# Patient Record
Sex: FEMALE | Race: WHITE | NOT HISPANIC OR LATINO | Employment: PART TIME | URBAN - METROPOLITAN AREA
[De-identification: names, ages, dates, MRNs, and addresses within clinical notes are randomized per-mention and may not be internally consistent; named-entity substitution may affect disease eponyms.]

---

## 2017-05-16 ENCOUNTER — HOSPITAL ENCOUNTER (OUTPATIENT)
Facility: MEDICAL CENTER | Age: 27
End: 2017-05-16
Attending: PHYSICIAN ASSISTANT
Payer: COMMERCIAL

## 2017-05-16 ENCOUNTER — OFFICE VISIT (OUTPATIENT)
Dept: URGENT CARE | Facility: PHYSICIAN GROUP | Age: 27
End: 2017-05-16
Payer: COMMERCIAL

## 2017-05-16 VITALS
WEIGHT: 145 LBS | RESPIRATION RATE: 16 BRPM | SYSTOLIC BLOOD PRESSURE: 100 MMHG | HEART RATE: 64 BPM | BODY MASS INDEX: 22.76 KG/M2 | HEIGHT: 67 IN | DIASTOLIC BLOOD PRESSURE: 60 MMHG | OXYGEN SATURATION: 99 % | TEMPERATURE: 97.3 F

## 2017-05-16 DIAGNOSIS — N30.00 ACUTE CYSTITIS WITHOUT HEMATURIA: ICD-10-CM

## 2017-05-16 LAB
APPEARANCE UR: CLEAR
BILIRUB UR STRIP-MCNC: ABNORMAL MG/DL
COLOR UR AUTO: ABNORMAL
GLUCOSE UR STRIP.AUTO-MCNC: ABNORMAL MG/DL
INT CON NEG: NORMAL
INT CON POS: NORMAL
KETONES UR STRIP.AUTO-MCNC: ABNORMAL MG/DL
LEUKOCYTE ESTERASE UR QL STRIP.AUTO: ABNORMAL
NITRITE UR QL STRIP.AUTO: ABNORMAL
PH UR STRIP.AUTO: ABNORMAL [PH] (ref 5–8)
POC URINE PREGNANCY TEST: NORMAL
PROT UR QL STRIP: ABNORMAL MG/DL
RBC UR QL AUTO: ABNORMAL
SP GR UR STRIP.AUTO: ABNORMAL
UROBILINOGEN UR STRIP-MCNC: ABNORMAL MG/DL

## 2017-05-16 PROCEDURE — 87077 CULTURE AEROBIC IDENTIFY: CPT

## 2017-05-16 PROCEDURE — 87086 URINE CULTURE/COLONY COUNT: CPT

## 2017-05-16 PROCEDURE — 81025 URINE PREGNANCY TEST: CPT | Performed by: PHYSICIAN ASSISTANT

## 2017-05-16 PROCEDURE — 81002 URINALYSIS NONAUTO W/O SCOPE: CPT | Performed by: PHYSICIAN ASSISTANT

## 2017-05-16 PROCEDURE — 99204 OFFICE O/P NEW MOD 45 MIN: CPT | Performed by: PHYSICIAN ASSISTANT

## 2017-05-16 PROCEDURE — 87186 SC STD MICRODIL/AGAR DIL: CPT

## 2017-05-16 PROCEDURE — 99000 SPECIMEN HANDLING OFFICE-LAB: CPT | Performed by: PHYSICIAN ASSISTANT

## 2017-05-16 RX ORDER — NITROFURANTOIN 25; 75 MG/1; MG/1
100 CAPSULE ORAL 2 TIMES DAILY
Qty: 10 CAP | Refills: 0 | Status: SHIPPED | OUTPATIENT
Start: 2017-05-16 | End: 2017-05-21

## 2017-05-16 RX ORDER — NORGESTIMATE AND ETHINYL ESTRADIOL 7DAYSX3 LO
KIT ORAL
COMMUNITY

## 2017-05-16 NOTE — PROGRESS NOTES
"Chief Complaint   Patient presents with   • Dysuria     1 day       HISTORY OF PRESENT ILLNESS: Patient is a 26 y.o. female who presents today for about 24 hours of UTI symptoms.   She is feeling some pressure in bladder area, hurts to pee towards the end and increased urgency and frequency.  She has not noticed any blood in her urine.  No nausea, vomiting, fevers, or chills or flank/back pain.  No vaginal discharge or abnormal.   She has been taking Azo with mild relief.       There are no active problems to display for this patient.      Allergies:Asa; Pcn; and Sulfa drugs    Current Outpatient Prescriptions Ordered in [facility]   Medication Sig Dispense Refill   • Phenazopyridine-Butabarb-Hyosc (PYRIDIUM PLUS PO) Take  by mouth.     • Norgestim-Eth Estrad Triphasic (ORTHO TRI-CYCLEN LO) 0.18/0.215/0.25 MG-25 MCG Tab Take  by mouth.       No current Epic-ordered facility-administered medications on file.       No past medical history on file.    Social History   Substance Use Topics   • Smoking status: Never Smoker    • Smokeless tobacco: None   • Alcohol Use: None       No family status information on file.   No family history on file. No pertinent FH.     ROS:  Review of Systems   Constitutional: Negative for fever, chills, weight loss and malaise/fatigue.   HENT: Negative for ear pain, nosebleeds, congestion, sore throat and neck pain.    Eyes: Negative for blurred vision.   Respiratory: Negative for cough, sputum production, shortness of breath and wheezing.    Cardiovascular: Negative for chest pain, palpitations, orthopnea and leg swelling.   Gastrointestinal: Negative for heartburn, nausea, vomiting and abdominal pain.   Genitourinary: SEE HPI  All other systems reviewed and are negative.       Exam:  Blood pressure 100/60, pulse 64, temperature 36.3 °C (97.3 °F), resp. rate 16, height 1.702 m (5' 7.01\"), weight 65.772 kg (145 lb), last menstrual period 05/10/2017, SpO2 99 %, not currently " breastfeeding.  General:  Well nourished, well developed female in NAD  Eyes: PERRLA, EOM within normal limits, no conjunctival injection, no scleral icterus, visual fields and acuity grossly intact.  Ears: Normal shape and symmetry, no tenderness, no discharge. External canals are without any significant edema or erythema. Tympanic membranes are without any inflammation, no effusion. Gross auditory acuity is intact  Nose: Symmetrical, sinuses without tenderness, no discharge.   Mouth: reasonable hygiene, no erythema exudates or tonsillar enlargement.  Neck: no masses, range of motion within normal limits, no tracheal deviation. No lymphadenopathy  Pulmonary: Normal respiratory effort, no wheezes, crackles, or rhonchi.  Cardiovascular: regular rate and rhythm without murmurs, rubs, or gallops.  Abdomen: Soft, mild TTP suprapubically, nondistended. Normal bowel sounds. No hepatosplenomegaly or masses, or hernias. No rebound or guarding.  No CVA tenderness   Skin: No visible rashes or lesion. Warm, pink, dry.   Extremities: no clubbing, cyanosis, or edema.  Neuro: A&O x 3. Speech normal/clear.  Normal gait.       Component Results      Component Value Ref Range & Units Status     POC Color Red Negative Final     POC Appearance Clear Negative Final     POC Leukocyte Esterase Small Negative Final     POC Nitrites Inc Negative Final     POC Urobiligen Inc Negative (0.2) mg/dL Final     POC Protein Inc Negative mg/dL Final     POC Urine PH Inc 5.0 - 8.0 Final     POC Blood Small Negative Final     POC Specific [place] Inc <1.005 - >1.030 Final     POC Ketones Inc Negative mg/dL Final     POC Biliruben Inc Negative mg/dL Final     POC Glucose Neg Negative mg/dL Final         Assessment/Plan:  1. Acute cystitis without hematuria  POCT Urinalysis    POCT Pregnancy    URINE CULTURE(NEW)    nitrofurantoin monohydr macro (MACROBID) 100 MG Cap       -POCT U/A as above.  Culture sent  -Fluids emphasized.  Void after intercourse.   Recommend abstain until treatment complete/symptoms resolved.   -signs and symptoms of worsening/ascending infection discussed and to seek prompt medical care should they arise.       Supportive care, differential diagnoses, and indications for immediate follow-up discussed with patient.   Pathogenesis of diagnosis discussed including typical length and natural progression.   Instructed to return to clinic or nearest emergency department for any change in condition, further concerns, or worsening of symptoms.  Patient states understanding of the plan of care and discharge instructions.      Ariela Joyner PA-C

## 2017-05-16 NOTE — MR AVS SNAPSHOT
"        Alicia R Nelly   2017 10:15 AM   Office Visit   MRN: [id number]    Department:  [facility]   Dept Phone:  [phone]    Description:  Female : 1990   Provider:  Ariela Joyner PA-C           Reason for Visit     Dysuria 1 day      Allergies as of 2017     Allergen Noted Reactions    Asa [Aspirin] 2017   Shortness of Breath    Pcn [Penicillins] 2017   Hives    Sulfa Drugs 2017   Hives      You were diagnosed with     Acute cystitis without hematuria   [[id number]]         Vital Signs     Blood Pressure Pulse Temperature Respirations Height Weight    100/60 mmHg 64 36.3 °C (97.3 °F) 16 1.702 m (5' 7.01\") 65.772 kg (145 lb)    Body Mass Index Oxygen Saturation Last Menstrual Period Breastfeeding? Smoking Status       22.71 kg/m2 99% 05/10/2017 No Never Smoker        Basic Information     Date Of Birth Sex Race Ethnicity Preferred Language    1990 Female White Non- English      Health Maintenance     Patient has no pending health maintenance at this time      Results     POCT Urinalysis      Component Value Standard Range & Units    POC Color Red Negative    POC Appearance Clear Negative    POC Leukocyte Esterase Small Negative    POC Nitrites Inc Negative    POC Urobiligen Inc Negative (0.2) mg/dL    POC Protein Inc Negative mg/dL    POC Urine PH Inc 5.0 - 8.0    POC Blood Small Negative    POC Specific [place] Inc <1.005 - >1.030    POC Ketones Inc Negative mg/dL    POC Biliruben Inc Negative mg/dL    POC Glucose Neg Negative mg/dL                POCT Pregnancy      Component Value Standard Range & Units    POC Urine Pregnancy Test Neg Negative    Internal Control Positive Valid     Internal Control Negative Valid                         Current Immunizations     No immunizations on file.      Below and/or attached are the medications your provider expects you to take. Review all of your home medications and newly ordered medications with your provider " and/or pharmacist. Follow medication instructions as directed by your provider and/or pharmacist. Please keep your medication list with you and share with your provider. Update the information when medications are discontinued, doses are changed, or new medications (including over-the-counter products) are added; and carry medication information at all times in the event of emergency situations     Allergies:  ASA - Shortness of Breath     PCN - Hives     SULFA DRUGS - Hives               Medications  Valid as of: May 16, 2017 - 11:12 AM    Generic Name Brand Name Tablet Size Instructions for use    Nitrofurantoin Monohyd Macro (Cap) MACROBID 100 MG Take 1 Cap by mouth 2 times a day for 5 days.        Norgestim-Eth Estrad Triphasic (Tab) Norgestim-Eth Estrad Triphasic 0.18/0.215/0.25 MG-25 MCG Take  by mouth.        Phenazopyridine-Butabarb-Hyosc   Take  by mouth.        .                 Medicines prescribed today were sent to:     [facility] [address] [place] [address] [place], [place] - [address] [facility] [email or web address] [place] [address]    Phone: [phone] Fax: [phone]    Open 24 Hours?: No      Medication refill instructions:       If your prescription bottle indicates you have medication refills left, it is not necessary to call your provider’s office. Please contact your pharmacy and they will refill your medication.    If your prescription bottle indicates you do not have any refills left, you may request refills at any time through one of the following ways: The online [facility] system (except Urgent Care), by calling your provider’s office, or by asking your pharmacy to contact your provider’s office with a refill request. Medication refills are processed only during regular business hours and may not be available until the next business day. Your provider may request additional information or to have a follow-up visit with you prior to refilling your medication.   *Please Note:  Medication refills are assigned a new Rx number when refilled electronically. Your pharmacy may indicate that no refills were authorized even though a new prescription for the same medication is available at the pharmacy. Please request the medicine by name with the pharmacy before contacting your provider for a refill.        Your To Do List     Future Labs/Procedures Complete By Expires    URINE CULTURE(NEW)  As directed 5/16/2018         [facility] Access Code: [email or web address]  Expires: 6/15/2017 10:11 AM    [facility]  A secure, online tool to manage your health information     [employer] [facility]® is a secure, online tool that connects you to your personalized health information from the privacy of your home -- day or night - making it very easy for you to manage your healthcare. Once the activation process is completed, you can even access your medical information using the [facility] alex, which is available for free in the Apple Alex store or Google Play store.     [facility] provides the following levels of access (as shown below):   My Chart Features   [facility] Doctor [facility]  Specialists [facility]  Urgent  Care Non-[facility]  Primary Care  Doctor   Email your healthcare team securely and privately 24/7 X X X    Manage appointments: schedule your next appointment; view details of past/upcoming appointments X      Request prescription refills. X      View recent personal medical records, including lab and immunizations X X X X   View health record, including health history, allergies, medications X X X X   Read reports about your outpatient visits, procedures, consult and ER notes X X X X   See your discharge summary, which is a recap of your hospital and/or ER visit that includes your diagnosis, lab results, and care plan. X X       How to register for [facility]:  1. Go to  [email or web address].  2. Click on the Sign Up Now box, which takes you to the New Member Sign Up page. You will need to provide the  following information:  a. Enter your [facility] Access Code exactly as it appears at the top of this page. (You will not need to use this code after you’ve completed the sign-up process. If you do not sign up before the expiration date, you must request a new code.)   b. Enter your date of birth.   c. Enter your home email address.   d. Click Submit, and follow the next screen’s instructions.  3. Create a [facility] ID. This will be your [employer] login ID and cannot be changed, so think of one that is secure and easy to remember.  4. Create a [employer] password. You can change your password at any time.  5. Enter your Password Reset Question and Answer. This can be used at a later time if you forget your password.   6. Enter your e-mail address. This allows you to receive e-mail notifications when new information is available in [facility].  7. Click Sign Up. You can now view your health information.    For assistance activating your [employer] account, call [phone]

## 2017-05-18 LAB
BACTERIA UR CULT: ABNORMAL
SIGNIFICANT IND 70042: ABNORMAL
SOURCE SOURCE: ABNORMAL

## 2018-05-10 ENCOUNTER — OFFICE VISIT (OUTPATIENT)
Dept: DERMATOLOGY | Facility: IMAGING CENTER | Age: 28
End: 2018-05-10
Payer: COMMERCIAL

## 2018-05-10 VITALS — BODY MASS INDEX: 22.76 KG/M2 | HEIGHT: 67 IN | TEMPERATURE: 99.7 F | WEIGHT: 145 LBS

## 2018-05-10 DIAGNOSIS — D22.9 JUNCTIONAL NEVUS: ICD-10-CM

## 2018-05-10 DIAGNOSIS — L70.0 ACNE VULGARIS: ICD-10-CM

## 2018-05-10 PROCEDURE — 99202 OFFICE O/P NEW SF 15 MIN: CPT | Performed by: DERMATOLOGY

## 2018-05-10 RX ORDER — TRETINOIN 0.8 MG/G
1 GEL TOPICAL
Qty: 45 G | Refills: 3 | Status: SHIPPED | OUTPATIENT
Start: 2018-05-10

## 2018-05-10 ASSESSMENT — ENCOUNTER SYMPTOMS
FEVER: 0
CHILLS: 0

## 2018-05-10 NOTE — PROGRESS NOTES
Dermatology New Patient Visit    Chief Complaint   Patient presents with   • Establish Care       Subjective:     HPI:   Alicia Villegas is a 27 y.o. female presenting for    HPI/location: Lesion in left groin area  Time present: 2-3 years  Painful lesion: No  Itching lesion: No  Enlarging lesion: No  Anything make it better or worse? n/a  History of skin cancer: No  History of precancers/actinic keratoses: No  History of biopsies:No  History of blistering/severe sunburns:No  Family history of skin cancer:No  Family history of atypical moles:No    Acne  Started: late teens  Active on: face  Aggravated by: none currently  Treatment currently used: independent acne line from [place]  Prior treatments used: several rx in the past  Face wash/moisturizer: lactic acid/jojoba oil/avocado oil wash  Family history of scarring acne: no  Contraception: OCPs - acne has been under control while on this treatment, but she is thinking about stopping next month, but will continue with condoms/protection (does not want to start trying form 6-12 months)          No past medical history on file.    Current Outpatient Prescriptions on File Prior to Visit   Medication Sig Dispense Refill   • Phenazopyridine-Butabarb-Hyosc (PYRIDIUM PLUS PO) Take  by mouth.     • Norgestim-Eth Estrad Triphasic (ORTHO TRI-CYCLEN LO) 0.18/0.215/0.25 MG-25 MCG Tab Take  by mouth.       No current facility-administered medications on file prior to visit.        Allergies   Allergen Reactions   • Asa [Aspirin] Shortness of Breath   • Pcn [Penicillins] Hives   • Sulfa Drugs Hives       No family history on file.    Social History     Social History   • Marital status: Single     Spouse name: N/A   • Number of children: N/A   • Years of education: N/A     Occupational History   • Not on file.     Social History Main Topics   • Smoking status: Never Smoker   • Smokeless tobacco: Not on file   • Alcohol use Not on file   • Drug use: Unknown   • Sexual activity: Not  "on file     Other Topics Concern   • Not on file     Social History Narrative   • No narrative on file       Review of Systems   Constitutional: Negative for chills and fever.   Skin: Negative for itching and rash.   All other systems reviewed and are negative.       Objective:     A focused cutaneous exam was completed including: hair, face, eyelids, conjunctiva, lips, neck, abdomen with the following pertinent findings listed below. Remaining above-listed examined areas within normal limits / negative for rashes or lesions.    Temperature 37.6 °C (99.7 °F), height 1.702 m (5' 7\"), weight 65.8 kg (145 lb).    Physical Exam   Constitutional: She is oriented to person, place, and time and well-developed, well-nourished, and in no distress.   HENT:   Head: Normocephalic and atraumatic.       Eyes: Conjunctivae and lids are normal.   Neck: Normal range of motion.   Pulmonary/Chest: Effort normal.   Neurological: She is alert and oriented to person, place, and time.   Skin: Skin is warm and dry.        Psychiatric: Mood and affect normal.   Vitals reviewed.      DATA: none applicable to review    Assessment and Plan:     1. Acne vulgaris, comedonal and inflammatory , mild  - educated patient about diagnosis, management options, and expectations of treatment  - can continue OTC daily face wash (independent line out of [place] with lactic acid)  - Instructed to start retin-a 0.08% gel qhs. To use pea-sized amount on entire face; start 2-3 nights/week and titrate up as tolerated. S/e irritation discussed.  - if skin becomes dry, OTC moisturizers recommended  - if needed, can add La Roche Posay Effaclar Duo daily   - given patient is going off OCPs, will follow, discussed low dose ABX if needed systemically, but  patient aware that all above medications not safe during pregnancy, and has been advised to stop medications if she is to become pregnant; discussed possibility of azelaic acid if needed if/when pregnant  - discussed " suggested dietary changes; trial of cutting out dairy products, trial of cutting out sugars/soda/high glycemic index foods  - add spearmint tea (1-2 cups daily) - to discuss with ob/gyn if becomes pregnant    2. Junctional nevus  - Benign-appearing nature of lesion discussed. Advised to return to clinic for any new or concerning changes.  - ABCDE's of melanoma discussed    Followup: Return in about 3 months (around 8/10/2018).    Mckenzie Walker M.D.

## 2018-07-17 ENCOUNTER — OFFICE VISIT (OUTPATIENT)
Dept: URGENT CARE | Facility: PHYSICIAN GROUP | Age: 28
End: 2018-07-17
Payer: COMMERCIAL

## 2018-07-17 ENCOUNTER — HOSPITAL ENCOUNTER (OUTPATIENT)
Facility: MEDICAL CENTER | Age: 28
End: 2018-07-17
Attending: EMERGENCY MEDICINE
Payer: COMMERCIAL

## 2018-07-17 VITALS
BODY MASS INDEX: 23.39 KG/M2 | DIASTOLIC BLOOD PRESSURE: 70 MMHG | SYSTOLIC BLOOD PRESSURE: 100 MMHG | OXYGEN SATURATION: 99 % | RESPIRATION RATE: 16 BRPM | TEMPERATURE: 98 F | HEIGHT: 67 IN | HEART RATE: 84 BPM | WEIGHT: 149 LBS

## 2018-07-17 DIAGNOSIS — R39.9 SYMPTOMS INVOLVING URINARY SYSTEM: ICD-10-CM

## 2018-07-17 DIAGNOSIS — Z87.440 HISTORY OF UTI: ICD-10-CM

## 2018-07-17 LAB
APPEARANCE UR: NORMAL
BILIRUB UR STRIP-MCNC: NORMAL MG/DL
COLOR UR AUTO: NORMAL
GLUCOSE UR STRIP.AUTO-MCNC: NORMAL MG/DL
INT CON NEG: NORMAL
INT CON POS: NORMAL
KETONES UR STRIP.AUTO-MCNC: NORMAL MG/DL
LEUKOCYTE ESTERASE UR QL STRIP.AUTO: NORMAL
NITRITE UR QL STRIP.AUTO: NORMAL
PH UR STRIP.AUTO: 6 [PH] (ref 5–8)
POC URINE PREGNANCY TEST: NEGATIVE
PROT UR QL STRIP: NORMAL MG/DL
RBC UR QL AUTO: NORMAL
SP GR UR STRIP.AUTO: 1.01
UROBILINOGEN UR STRIP-MCNC: NORMAL MG/DL

## 2018-07-17 PROCEDURE — 87491 CHLMYD TRACH DNA AMP PROBE: CPT

## 2018-07-17 PROCEDURE — 99203 OFFICE O/P NEW LOW 30 MIN: CPT | Performed by: EMERGENCY MEDICINE

## 2018-07-17 PROCEDURE — 81002 URINALYSIS NONAUTO W/O SCOPE: CPT | Performed by: EMERGENCY MEDICINE

## 2018-07-17 PROCEDURE — 81025 URINE PREGNANCY TEST: CPT | Performed by: EMERGENCY MEDICINE

## 2018-07-17 PROCEDURE — 87591 N.GONORRHOEAE DNA AMP PROB: CPT

## 2018-07-17 PROCEDURE — 81003 URINALYSIS AUTO W/O SCOPE: CPT

## 2018-07-17 RX ORDER — NITROFURANTOIN 25; 75 MG/1; MG/1
100 CAPSULE ORAL 2 TIMES DAILY
Qty: 10 CAP | Refills: 0 | Status: SHIPPED | OUTPATIENT
Start: 2018-07-17 | End: 2018-07-22

## 2018-07-17 ASSESSMENT — ENCOUNTER SYMPTOMS
NAUSEA: 0
FLANK PAIN: 0
SORE THROAT: 0
CHILLS: 0
CONSTIPATION: 0
ABDOMINAL PAIN: 0
EMPTYING BLADDER: 1
COUGH: 1
BLOOD IN STOOL: 0
SHORTNESS OF BREATH: 0
SWEATS: 0
DIARRHEA: 0
VOMITING: 0
FEVER: 0

## 2018-07-17 ASSESSMENT — PAIN SCALES - GENERAL: PAINLEVEL: NO PAIN

## 2018-07-17 NOTE — PROGRESS NOTES
Subjective:      Alicia Duran is a 28 y.o. female who presents with Cystitis (x 2 days)            Cystitis    This is a new problem. Episode onset: 2 days. The problem occurs intermittently. The problem has been gradually improving. The patient is experiencing no pain. There has been no fever. There is no history of pyelonephritis. Associated symptoms include urgency. Pertinent negatives include no chills, discharge, flank pain, frequency, hematuria, hesitancy, nausea, possible pregnancy, sweats or vomiting. She has tried increased fluids for the symptoms. The treatment provided moderate relief. Her past medical history is significant for recurrent UTIs. There is no history of kidney stones.   Patient notes recent URI resolving.    Review of Systems   Constitutional: Negative for chills and fever.   HENT: Negative for congestion and sore throat.    Respiratory: Positive for cough. Negative for shortness of breath.    Gastrointestinal: Negative for abdominal pain, blood in stool, constipation, diarrhea, nausea and vomiting.   Genitourinary: Positive for urgency. Negative for dysuria, flank pain, frequency, hematuria and hesitancy.        No vaginal discharge or bleeding. Denies pregnancy.  LMP 2 weeks ago.  Denies sexually transmitted disease exposure or risk.     Skin: Negative for rash.     PMH:  has no past medical history on file.  MEDS:   Current Outpatient Prescriptions:   •  nitrofurantoin monohydr macro (MACROBID) 100 MG Cap, Take 1 Cap by mouth 2 times a day for 5 days., Disp: 10 Cap, Rfl: 0  •  Norgestim-Eth Estrad Triphasic (ORTHO TRI-CYCLEN LO) 0.18/0.215/0.25 MG-25 MCG Tab, Take  by mouth., Disp: , Rfl:   •  Tretinoin Microsphere (RETIN-A MICRO PUMP) 0.08 % Gel, 1 Application by Apply externally route every bedtime., Disp: 45 g, Rfl: 3  •  Phenazopyridine-Butabarb-Hyosc (PYRIDIUM PLUS PO), Take  by mouth., Disp: , Rfl:   ALLERGIES:   Allergies   Allergen Reactions   • Asa [Aspirin] Shortness of  "Breath   • Pcn [Penicillins] Hives   • Sulfa Drugs Hives     SURGHX: History reviewed. No pertinent surgical history.  SOCHX:  reports that she has never smoked. She has never used smokeless tobacco. She reports that she drinks alcohol. She reports that she does not use drugs.  FH: family history is not on file.       Objective:     /70   Pulse 84   Temp 36.7 °C (98 °F)   Resp 16   Ht 1.702 m (5' 7\")   Wt 67.6 kg (149 lb)   LMP 07/03/2018   SpO2 99%   Breastfeeding? No   BMI 23.34 kg/m²      Physical Exam   Constitutional: She appears well-developed and well-nourished. She is cooperative. She does not have a sickly appearance. She does not appear ill. No distress.   Cardiovascular: Normal rate, regular rhythm and normal heart sounds.    Pulmonary/Chest: Effort normal and breath sounds normal.   Abdominal: Soft. Bowel sounds are normal. She exhibits no distension and no mass. There is tenderness in the suprapubic area. There is no rigidity, no rebound, no guarding and no CVA tenderness.   Minimal suprapubic tenderness.   Neurological: She is alert.   Skin: Skin is warm and dry.   Psychiatric: She has a normal mood and affect.          Symptoms highly suspect for cystitis; not urethral syndrome. Empiric antibiotics despite negative urine dipstick test. Advised need for reevaluation if any worsening condition.     Assessment/Plan:     1. Symptoms involving urinary system  negative- POCT Urinalysis  negative- POCT Pregnancy  - URINALYSIS,CULTURE IF INDICATED; Future  - CHLAMYDIA/GC PCR URINE OR SWAB; Future    2. History of UTI  - nitrofurantoin monohydr macro (MACROBID) 100 MG Cap; Take 1 Cap by mouth 2 times a day for 5 days.  Dispense: 10 Cap; Refill: 0      "

## 2018-07-18 DIAGNOSIS — R39.9 SYMPTOMS INVOLVING URINARY SYSTEM: ICD-10-CM

## 2018-07-18 LAB
APPEARANCE UR: CLEAR
C TRACH DNA SPEC QL NAA+PROBE: NEGATIVE
COLOR UR: YELLOW
GLUCOSE UR STRIP.AUTO-MCNC: NEGATIVE MG/DL
KETONES UR STRIP.AUTO-MCNC: NEGATIVE MG/DL
LEUKOCYTE ESTERASE UR QL STRIP.AUTO: NEGATIVE
MICRO URNS: NORMAL
N GONORRHOEA DNA SPEC QL NAA+PROBE: NEGATIVE
NITRITE UR QL STRIP.AUTO: NEGATIVE
PH UR STRIP.AUTO: 7 [PH]
PROT UR QL STRIP: NEGATIVE MG/DL
RBC UR QL AUTO: NEGATIVE
SP GR UR STRIP.AUTO: 1.01
SPECIMEN SOURCE: NORMAL

## 2018-07-19 ENCOUNTER — TELEPHONE (OUTPATIENT)
Dept: URGENT CARE | Facility: PHYSICIAN GROUP | Age: 28
End: 2018-07-19

## 2018-07-19 NOTE — TELEPHONE ENCOUNTER
Please notify patient urine testing negative; advise stop antibiotic, F/U PCP if symptoms not resolved.

## 2018-08-09 ENCOUNTER — TELEPHONE (OUTPATIENT)
Dept: DERMATOLOGY | Facility: IMAGING CENTER | Age: 28
End: 2018-08-09

## 2018-08-09 NOTE — TELEPHONE ENCOUNTER
Alicia called today to cancel her follow up appointment next week due to her insurance changing to [employer] which we are not contracted with.